# Patient Record
(demographics unavailable — no encounter records)

---

## 2019-01-05 NOTE — RAD
CHEST ONE VIEW:

1/5/19

 

HISTORY: 

Chest pain and cough.

 

COMPARISON:  

Radiograph 4/18/16.

 

FINDINGS:  

There is severe dextroscoliosis. Left basilar air space opacity is new from the comparison examinatio
n.

 

Severe neurogenic changes of both shoulders. 

 

IMPRESSION:  

Findings concerning for left basilar pneumonia. 

 

POS: [facility]

## 2019-01-06 NOTE — HP
CHIEF COMPLAINT:  Cough.



HISTORY OF PRESENT ILLNESS:  This is a 54-year-old male admitting to the hospital

because of cough and chills for approximately one week.  The patient states that he

also had some diarrhea, however, this has now resolved.  The patient states that he

has never had these symptoms before.  Denies any alleviating or aggravating factors.

 Denies any other associated symptoms or complaints.  Of note, the patient does have

a growth abnormality and past medical history 

of hypertension, headaches, and arthritis.  The patient is seen and examined in the

hospital room.  No family at bedside. 



ALLERGIES:  TO ASPIRIN, CYCLOPHOSPHAMIDE, HYDROCODONE, AND PHENYTOIN.  THE PATIENT

IS NOT SURE WHAT HAPPENS WHEN HE TAKES ANY ONE OF THESE. 



REVIEW OF SYSTEMS:  All systems reviewed.  Pertinent positives in HPI, otherwise

negative. 



FAMILY HISTORY:  Noncontributory.



SOCIAL HISTORY:  Nondrinker, nonsmoker.



HOME MEDICATIONS:  See MAR.



PHYSICAL EXAMINATION:

VITAL SIGNS:  Blood pressure 101/66, respiratory rate of 20, temperature 98.3, heart

rate of 86. 

GENERAL:  The patient lying in bed comfortably, in no distress.  Body habitus

appears to be that of individual with growth stunting. 

HEENT:  Pupils equal, round, reactive to light and accommodation.  Extraocular

muscles intact.  Oral cavity moist and pink. 

NECK:  Supple, nontender.  Mobile thyroid appreciated. 

CARDIOVASCULAR:  Regular rate and rhythm.  S1, S2.  No murmurs, rubs, or gallops

appreciated. 

PULMONARY:  Coarse breath sounds bilaterally.  No respiratory distress.  No increase

in AP diameter. 

ABDOMEN:  Positive bowel sounds.  Soft, nontender, nondistended. 

EXTREMITIES:  2+ peripheral pulses.  No cyanosis, clubbing, or edema noted.

Arthritis in bilateral hands. 



LABORATORY DATA:  Lab wise, CBC normal.  BMP abnormalities, creatinine 1.93.  UA

positive for some protein, otherwise negative.  Chest x-ray shows left basilar

pneumonia. 



ASSESSMENT AND PLAN:  

1. Cough, shortness of breath, pneumonia.

2. Hypertension.

3. Arthritis.

4. Fever.



PLAN:  At this point in time, I am going to admit the patient to hospital floor and

start IV antibiotics and IV fluids.  Also obtain an echocardiogram and chest x-ray

for tomorrow.  Labs in a.m.  Continue hydration.  If renal function worsens, we will

obtain renal ultrasound and consider renal consultation as well.  The patient wishes

to remain a full code at this point in time.  We will adjust medical plan of care as

the patient's condition progresses throughout his admission.  Case and plan

discussed with the patient at length.  He understood and agreed with this plan. 







Job ID:  [id number]

## 2019-01-07 NOTE — RAD
CHEST ONE VIEW:

 

Indication: Cough. 

 

Comparison: 1-5-19

 

FINDINGS: 

Left basilar consolidation persist. Chronic lung changes and cardiomegaly are stable. Severe thoracol
umbar scoliosis and diffuse osteopenia is similar. 

 

IMPRESSION: 

Stable left basilar pneumonia. 

 

POS: [facility]

## 2019-01-07 NOTE — PDOC.PN
- Subjective


Encounter Start Date: 01/07/19


Encounter Start Time: 10:31


Subjective: still has cough and congestion





- Objective


MAR Reviewed: Yes


Vital Signs & Weight: 


 Vital Signs (12 hours)











  Temp Pulse Resp BP Pulse Ox


 


 01/07/19 08:00  98.2 F  94  22 H  100/62  93 L








 Weight











Weight                         74 lb 3 oz














I&O: 


 











 01/06/19 01/07/19 01/08/19





 06:59 06:59 06:59


 


Intake Total 1470 960 


 


Balance 1470 960 











Result Diagrams: 


 01/07/19 07:22





 01/07/19 07:22


Additional Labs: 


 Accuchecks











  01/06/19 01/06/19





  16:50 12:03


 


POC Glucose  96  110














Phys Exam





- Physical Examination


Neck: no JVD


rales, LLL


Cardiovascular: RRR, no significant murmur


Gastrointestinal: soft, positive bowel sounds


Musculoskeletal: no edema





Dx/Plan


(1) Pneumonia


Code(s): J18.9 - PNEUMONIA, UNSPECIFIED ORGANISM   Status: Acute   


Qualifiers: 


   Laterality: left   Lung location: lower lobe of lung 





(2) Influenza


Code(s): J11.1 - FLU DUE TO UNIDENTIFIED INFLUENZA VIRUS W OTH RESP MANIFEST   

Status: Acute   Comment: A   





(3) HTN (hypertension)


Code(s): I10 - ESSENTIAL (PRIMARY) HYPERTENSION   Status: Acute   





(4) JRA (juvenile rheumatoid arthritis)


Code(s): M08.00 - UNSP JUVENILE RHEUMATOID ARTHRITIS OF UNSPECIFIED SITE   

Status: Acute   





- Plan


cont iv antibx, tamiflu


-: probably home 1-3 days depending on clinical course





* .

## 2019-01-08 NOTE — PDOC.PN
- Subjective


Encounter Start Date: 01/08/19


Encounter Start Time: 11:48


Subjective: feels better, still has some sob, cough





- Objective


MAR Reviewed: Yes


Vital Signs & Weight: 


 Vital Signs (12 hours)











  Temp Pulse Resp BP Pulse Ox


 


 01/08/19 08:00  98.3 F  94  18  101/71  94 L








 Weight











Admit Weight                   74 lb


 


Weight                         74 lb 3 oz














I&O: 


 











 01/07/19 01/08/19 01/09/19





 06:59 06:59 06:59


 


Intake Total 960 1000 


 


Balance 960 1000 











Result Diagrams: 


 01/07/19 07:22





 01/07/19 07:22


Additional Labs: 


 Accuchecks











  01/07/19 01/07/19





  15:42 11:05


 


POC Glucose  112 H  111 H














Phys Exam





- Physical Examination


Neck: no JVD


rales L chest


Cardiovascular: RRR, no significant murmur


Gastrointestinal: soft, non-tender


Musculoskeletal: no edema





Dx/Plan


(1) Pneumonia


Code(s): J18.9 - PNEUMONIA, UNSPECIFIED ORGANISM   Status: Acute   


Qualifiers: 


   Laterality: left   Lung location: lower lobe of lung 





(2) Influenza


Code(s): J11.1 - FLU DUE TO UNIDENTIFIED INFLUENZA VIRUS W OTH RESP MANIFEST   

Status: Acute   Comment: A   





(3) HTN (hypertension)


Code(s): I10 - ESSENTIAL (PRIMARY) HYPERTENSION   Status: Acute   





(4) JRA (juvenile rheumatoid arthritis)


Code(s): M08.00 - UNSP JUVENILE RHEUMATOID ARTHRITIS OF UNSPECIFIED SITE   

Status: Acute   





- Plan


cultures neg


-: cont levaquin, tamiflu





* .

## 2019-01-08 NOTE — PQF
CLINICAL DOCUMENTATION IMPROVEMENT CLARIFICATION FORM:  ICD-10 Updated

PLEASE DO AN ADDENDUM TO THE PROGRESS NOTE WITH ANY DOCUMENTATION UPDATES OR 
ADDITIONS AND CARRY THROUGH TO DC SUMMARY.   THANK YOU.



DATE:   1/8/2019                                    ATTN: Dr. Guidry



Please exercise your independent, professional judgment in responding to the 
clarification form. 

Clinical indicators are provided on the bottom of this form for your review



Please check appropriate box(s):

[ x ] Acute Renal Failure (ARF) / Acute Kidney Injury (SELMA) 

[  ] Other Etiology or underlying conditions related to the diagnosis of ARF/ 
SELMA: ________________

[  ] Other diagnosis ___________

[  ] Unable to determine



In addition, please specify:

Present on Admission (POA):  [ x ] Yes             [  ] No             [  ] 
Unable to determine



For continuity of documentation, please document condition throughout progress 
notes and discharge summary.  Thank You.



CLINICAL INDICATORS - SIGNS / SYMPTOMS/ LABS are present in the medical record:



                                    1/5/2019 1/7/2019

Labs:    Creatinine           1.93                               0.66

   Estimated GFR             36                              > 90 



RISKS:

H&P 1/5: Cough and chills for approximately one week. Also had some diarrhea, 
however this has now resolved. 

PN 1/7: Pneumonia. Influenza A. HTN. Juvenile Rheumatoid Arthritis.



TREATMENT:

H&P 1/5: Continue hydration. If renal function worsens, we will obtain renal 
ultrasound and 

               consider renal consultation as well. 

Order 1/5: Normal Saline IV 75 mls / hr.  Dc'd 1/6/19.

______________________________________________________





Thank you,

Nicole

(This form is maintained as a part of the permanent medical record)

 2015 [employer]  All Rights Reserved

Nicole Farmer RN, BSN    [email or web address]    Office: [phone]

                                                              

[facility]

## 2019-01-09 NOTE — DIS
DATE OF ADMISSION:  01/05/2019



DATE OF DISCHARGE:  01/09/2019



PRIMARY CARE PROVIDER:  Dr. Valorie Brady.



FINAL DIAGNOSES:  

1. Pneumonia, probable strep pneumonia.

2. Acute influenza type A.

3. Hypertension.

4. Juvenile rheumatoid arthritis.

5. Acute kidney failure.



DISCHARGE MEDICATIONS:  

1. Levaquin 500 mg a day for 7 days.

2. Tamiflu 75 mg twice a day for 10 days total.

3. Amlodipine/valsartan 5/160 one a day.



ALLERGIES:  ALLERGIC TO DILANTIN, HYDROCODONE, CYTOXAN, AND ASPIRIN.



PENDING AT TIME OF DISCHARGE:  Blood cultures are no growth at 48 hours.  Urine

culture is no growth. 



DIET:  Diet is heart healthy.



CODE STATUS:  Full.



CONSULTATIONS:  None.



PROCEDURES:  None.



HOSPITAL COURSE:  The patient was admitted to [facility] to [facility] with left basilar pneumonia, influenza A, hypertension, 
history

of juvenile rheumatoid arthritis treated with Levaquin and Tamiflu.  He has 
improved

steadily during his hospital stay.  He still has rales over his left lateral 
chest

consistent with his initial chest x-ray demonstrating left lower lobe 
infiltrate.

His CBC had a white cell count of 5.7 with absolute neutrophillia. with

followup 4.9 with a normal differential.  His chemistries, initial creatinine 
1.93,

which dropped to 0.66.  He is currently doing well.  Feeling better.  Taking 
oral

fluids and food.  Vital signs are stable.  Afebrile.  He is being discharged to

follow up with his primary care doctor in one week. 







Job ID:  [id number]



MTDD